# Patient Record
Sex: FEMALE | Race: WHITE | URBAN - METROPOLITAN AREA
[De-identification: names, ages, dates, MRNs, and addresses within clinical notes are randomized per-mention and may not be internally consistent; named-entity substitution may affect disease eponyms.]

---

## 2015-11-27 LAB
CHOLEST SERPL-MCNC: 217 MG/DL
HDLC SERPL-MCNC: 39 MG/DL
LDLC SERPL CALC-MCNC: 149 MG/DL
NONHDLC SERPL-MCNC: NORMAL MG/DL
TRIGL SERPL-MCNC: 144 MG/DL

## 2017-02-24 ENCOUNTER — OFFICE VISIT (OUTPATIENT)
Dept: FAMILY MEDICINE | Facility: CLINIC | Age: 52
End: 2017-02-24
Payer: COMMERCIAL

## 2017-02-24 VITALS
HEART RATE: 76 BPM | DIASTOLIC BLOOD PRESSURE: 83 MMHG | WEIGHT: 179.8 LBS | SYSTOLIC BLOOD PRESSURE: 138 MMHG | BODY MASS INDEX: 30.15 KG/M2 | OXYGEN SATURATION: 97 %

## 2017-02-24 DIAGNOSIS — L08.9 LOCAL INFECTION OF SKIN AND SUBCUTANEOUS TISSUE: ICD-10-CM

## 2017-02-24 DIAGNOSIS — L30.9 DERMATITIS: ICD-10-CM

## 2017-02-24 DIAGNOSIS — L02.12 BOIL OF NECK: Primary | ICD-10-CM

## 2017-02-24 PROCEDURE — 99213 OFFICE O/P EST LOW 20 MIN: CPT | Performed by: NURSE PRACTITIONER

## 2017-02-24 RX ORDER — CEPHALEXIN 500 MG/1
500 CAPSULE ORAL 3 TIMES DAILY
Qty: 30 CAPSULE | Refills: 0 | Status: SHIPPED | OUTPATIENT
Start: 2017-02-24 | End: 2017-03-07

## 2017-02-24 RX ORDER — CELECOXIB 200 MG/1
CAPSULE ORAL
Refills: 12 | COMMUNITY
Start: 2017-01-17 | End: 2017-10-05

## 2017-02-24 RX ORDER — BETAMETHASONE DIPROPIONATE 0.5 MG/G
OINTMENT, AUGMENTED TOPICAL
Qty: 45 G | Refills: 1 | Status: SHIPPED | OUTPATIENT
Start: 2017-02-24 | End: 2017-10-05

## 2017-02-24 RX ORDER — BETAMETHASONE DIPROPIONATE 0.5 MG/G
OINTMENT, AUGMENTED TOPICAL
Qty: 45 G | Refills: 0 | Status: SHIPPED | OUTPATIENT
Start: 2017-02-24 | End: 2017-02-24

## 2017-02-24 RX ORDER — SOLIFENACIN SUCCINATE 10 MG/1
TABLET, FILM COATED ORAL
COMMUNITY
Start: 2017-02-23

## 2017-02-24 NOTE — NURSING NOTE
"Chief Complaint   Patient presents with     Derm Problem       Initial /83  Pulse 76  Wt 179 lb 12.8 oz (81.6 kg)  SpO2 97%  BMI 30.15 kg/m2 Estimated body mass index is 30.15 kg/(m^2) as calculated from the following:    Height as of 11/19/12: 5' 4.75\" (1.645 m).    Weight as of this encounter: 179 lb 12.8 oz (81.6 kg).  Medication Reconciliation: complete     Peter Zimmerman CMA    "

## 2017-02-24 NOTE — PATIENT INSTRUCTIONS
Take full course of antibiotics, warm pack it 2-3 times per day, do not pick.  If symptoms persist, let me know and I will order a consult as we discussed.   What is a boil? -- A boil is an infection under the skin that causes a painful, pus-filled lump. A boil happens when bacteria infect a hair follicle. A hair follicle is a sac under the skin where a hair starts to grow (figure 1).   Boils usually happen on the back of the neck, face, armpits, and buttocks. But they can happen wherever hair grows on the body.   The medical term for a boil is  furuncle.  Sometimes, a few boils in the same area join together to form a big collection of pus. The medical term for a collection of boils is  carbuncle.   What are the symptoms of a boil? -- A boil is a painful, red lump under the skin. It is usually white or yellow in the center.   Boils can start out small and grow big quickly. They usually get more painful as they get bigger.  Should I try to treat my boil on my own? -- Yes. In order for your boil to heal, it needs to open so that the pus inside can drain out. Sometimes, boils open and drain on their own. But most boils need help to open and drain. After your boil drains, the pain should get much better.  To help your boil open and drain, you should put warm pressure on it. You can wet a clean wash cloth with warm water and put it on your boil. When the wash cloth cools, reheat it with warm water and put it back on the boil. Repeat these steps for 10 to 15 minutes every few hours. Make sure to wash your hands after you touch your boil.  Once your boil opens and drains, it should heal on its own. But it can take a few weeks for a boil to open, drain, and heal completely.  You should NOT squeeze or pop your boil. Doing these things can spread the infection.  Should I see a doctor or nurse? -- See a doctor or nurse if your boil doesn t get better after you try treating it at home. You should also see the doctor or nurse  if:  ?Your boil is very painful or gets worse.  ?Your boil gets better, but then comes back.  ?Your boil is on your face or spine.  ?You get a fever.  Will I need tests? -- Probably not. To make sure that you have a boil and not another skin condition, your doctor or nurse will ask about your symptoms and do an exam.  He or she might also take a small sample of pus from inside the boil. Then he or she will do lab tests on the sample to find out what kind of bacteria is causing the boil.   What other treatment might I have? -- If your boil is very big, your doctor might do a procedure to cut it open so it can drain. If you have a collection of boils, he or she will cut open the collection of boils to drain.   Depending on your boil and other factors, you might need to take antibiotic medicines.

## 2017-02-24 NOTE — MR AVS SNAPSHOT
After Visit Summary   2/24/2017    Mariely Apple    MRN: [id number]           Patient Information     Date Of Birth          1965        Visit Information        Provider Department      2/24/2017 4:00 PM Filomena Elias NP [facility]        Today's Diagnoses     Boil of neck    -  1    Local infection of skin and subcutaneous tissue        Dermatitis          Care Instructions    Take full course of antibiotics, warm pack it 2-3 times per day, do not pick.  If symptoms persist, let me know and I will order a consult as we discussed.   What is a boil? -- A boil is an infection under the skin that causes a painful, pus-filled lump. A boil happens when bacteria infect a hair follicle. A hair follicle is a sac under the skin where a hair starts to grow (figure 1).   Boils usually happen on the back of the neck, face, armpits, and buttocks. But they can happen wherever hair grows on the body.   The medical term for a boil is  furuncle.  Sometimes, a few boils in the same area join together to form a big collection of pus. The medical term for a collection of boils is  carbuncle.   What are the symptoms of a boil? -- A boil is a painful, red lump under the skin. It is usually white or yellow in the center.   Boils can start out small and grow big quickly. They usually get more painful as they get bigger.  Should I try to treat my boil on my own? -- Yes. In order for your boil to heal, it needs to open so that the pus inside can drain out. Sometimes, boils open and drain on their own. But most boils need help to open and drain. After your boil drains, the pain should get much better.  To help your boil open and drain, you should put warm pressure on it. You can wet a clean wash cloth with warm water and put it on your boil. When the wash cloth cools, reheat it with warm water and put it back on the boil. Repeat these steps for 10 to 15 minutes every few hours. Make sure to wash your  hands after you touch your boil.  Once your boil opens and drains, it should heal on its own. But it can take a few weeks for a boil to open, drain, and heal completely.  You should NOT squeeze or pop your boil. Doing these things can spread the infection.  Should I see a doctor or nurse? -- See a doctor or nurse if your boil doesn t get better after you try treating it at home. You should also see the doctor or nurse if:  ?Your boil is very painful or gets worse.  ?Your boil gets better, but then comes back.  ?Your boil is on your face or spine.  ?You get a fever.  Will I need tests? -- Probably not. To make sure that you have a boil and not another skin condition, your doctor or nurse will ask about your symptoms and do an exam.  He or she might also take a small sample of pus from inside the boil. Then he or she will do lab tests on the sample to find out what kind of bacteria is causing the boil.   What other treatment might I have? -- If your boil is very big, your doctor might do a procedure to cut it open so it can drain. If you have a collection of boils, he or she will cut open the collection of boils to drain.   Depending on your boil and other factors, you might need to take antibiotic medicines.          Follow-ups after your visit        Follow-up notes from your care team     Return if symptoms worsen or fail to improve.      Your next 10 appointments already scheduled     Feb 24, 2017  4:00 PM CST   Office Visit with Filomena Elias NP   [facility]    [address] [id number] [phone]           Bring a current list of meds and any records pertaining to this visit.  For Physicals, please bring immunization records and any forms needing to be filled out.  Please arrive 10 minutes early to complete paperwork.              Who to contact     Normal or non-critical lab and imaging results will be communicated to you by MyChart, letter or  phone within 4 business days after the clinic has received the results. If you do not hear from us within 7 days, please contact the clinic through [employer] or phone. If you have a critical or abnormal lab result, we will notify you by phone as soon as possible.  Submit refill requests through [employer] or call your pharmacy and they will forward the refill request to us. Please allow 3 business days for your refill to be completed.          If you need to speak with a  for additional information , please call: [phone]             Additional Information About Your Visit        [employer] Information     [employer] gives you secure access to your electronic health record. If you see a primary care provider, you can also send messages to your care team and make appointments. If you have questions, please call your primary care clinic.  If you do not have a primary care provider, please call [phone] and they will assist you.        Care EveryWhere ID     This is your Care EveryWhere ID. This could be used by other organizations to access your [place] medical records  [id number]        Your Vitals Were     Pulse Pulse Oximetry BMI (Body Mass Index)             76 97% 30.15 kg/m2          Blood Pressure from Last 3 Encounters:   02/24/17 138/83   12/04/12 127/88   11/19/12 124/76    Weight from Last 3 Encounters:   02/24/17 179 lb 12.8 oz (81.6 kg)   11/19/12 162 lb (73.5 kg)   10/31/11 170 lb (77.1 kg)              Today, you had the following     No orders found for display         Today's Medication Changes          These changes are accurate as of: 2/24/17  3:50 PM.  If you have any questions, ask your nurse or doctor.               Start taking these medicines.        Dose/Directions    cephALEXin 500 MG capsule   Commonly known as:  KEFLEX   Used for:  Boil of neck, Local infection of skin and subcutaneous tissue   Started by:  Filomena Elias NP        Dose:  500 mg   Take 1 capsule (500 mg)  by mouth 3 times daily   Quantity:  30 capsule   Refills:  0         These medicines have changed or have updated prescriptions.        Dose/Directions    * augmented betamethasone dipropionate 0.05 % ointment   Commonly known as:  DIPROLENE-AF   This may have changed:  Another medication with the same name was added. Make sure you understand how and when to take each.   Used for:  Eczema   Changed by:  Peter Lewis MD        Apply  topically 2 times daily as needed. Generic equivalent is preferred.   Quantity:  30 g   Refills:  2       * augmented betamethasone dipropionate 0.05 % ointment   Commonly known as:  DIPROLENE-AF   This may have changed:  You were already taking a medication with the same name, and this prescription was added. Make sure you understand how and when to take each.   Used for:  Dermatitis   Changed by:  Filomena Elias NP        Apply sparingly to affected area twice daily as needed.  Do not apply to face.   Quantity:  45 g   Refills:  0       * Notice:  This list has 2 medication(s) that are the same as other medications prescribed for you. Read the directions carefully, and ask your doctor or other care provider to review them with you.         Where to get your medicines      Some of these will need a paper prescription and others can be bought over the counter.  Ask your nurse if you have questions.     Bring a paper prescription for each of these medications     augmented betamethasone dipropionate 0.05 % ointment    cephALEXin 500 MG capsule                Primary Care Provider Office Phone #    Katya Spencer [facility] [phone]       No address on file        Thank you!     Thank you for choosing [facility]  for your care. Our goal is always to provide you with excellent care. Hearing back from our patients is one way we can continue to improve our services. Please take a few minutes to complete the written survey that you may receive in the mail after your  visit with us. Thank you!             Your Updated Medication List - Protect others around you: Learn how to safely use, store and throw away your medicines at www.disposemymeds.org.          This list is accurate as of: 2/24/17  3:50 PM.  Always use your most recent med list.                   Brand Name Dispense Instructions for use    ADVIL 200 MG capsule   Generic drug:  ibuprofen      1 CAPSULE EVERY 4 TO 6 HOURS AS NEEDED       * augmented betamethasone dipropionate 0.05 % ointment    DIPROLENE-AF    30 g    Apply  topically 2 times daily as needed. Generic equivalent is preferred.       * augmented betamethasone dipropionate 0.05 % ointment    DIPROLENE-AF    45 g    Apply sparingly to affected area twice daily as needed.  Do not apply to face.       celecoxib 200 MG capsule    celeBREX     TAKE 1 CAPSULE BY ORAL ROUTE TWICE DAILY AS NEEDED       cephALEXin 500 MG capsule    KEFLEX    30 capsule    Take 1 capsule (500 mg) by mouth 3 times daily       DULoxetine 30 MG EC capsule    CYMBALTA    60 capsule    Take one 30 mg capsule daily for 1 week ; then take 2 capsules daily       Fish Oil Oil      1 capsule.       meloxicam 7.5 MG tablet    MOBIC    60 tablet    Take 1-2 tablets by mouth daily.       MULTI VITAMIN/MINERALS PO      Take 1 tablet by mouth once.       predniSONE 5 MG tablet    DELTASONE    45 tablet    12.5 mg daily for one week then 7.5 mg daily for one week then stop       solifenacin 5 MG tablet    VESICARE    30 tablet    Take 1 tablet by mouth daily.       VESICARE 10 MG tablet   Generic drug:  solifenacin          * Notice:  This list has 2 medication(s) that are the same as other medications prescribed for you. Read the directions carefully, and ask your doctor or other care provider to review them with you.

## 2017-02-24 NOTE — PROGRESS NOTES
SUBJECTIVE:                                                    Mariely Apple is a 51 year old female who presents to clinic today for the following health issues:    Boil on neck- [id number]. Has grown, but now draining.   Pt using hot pack occasionally      Problem list and histories reviewed & adjusted, as indicated.  Additional history: as documented    Patient Active Problem List   Diagnosis     CARDIOVASCULAR SCREENING; LDL GOAL LESS THAN 160     Overactive bladder     OA (osteoarthritis)     Past Surgical History   Procedure Laterality Date     Appendectomy         Social History   Substance Use Topics     Smoking status: Never Smoker     Smokeless tobacco: Never Used     Alcohol use Yes      Comment: 1 drink per month     Family History   Problem Relation Age of Onset     DIABETES Father      HEART DISEASE Father      Obesity Father      Arthritis Maternal Grandmother          Current Outpatient Prescriptions   Medication Sig Dispense Refill     cephALEXin (KEFLEX) 500 MG capsule Take 1 capsule (500 mg) by mouth 3 times daily 30 capsule 0     augmented betamethasone dipropionate (DIPROLENE-AF) 0.05 % ointment Apply sparingly to affected area twice daily as needed.  Do not apply to face. 45 g 0     celecoxib (CELEBREX) 200 MG capsule TAKE 1 CAPSULE BY ORAL ROUTE TWICE DAILY AS NEEDED  12     VESICARE 10 MG tablet        meloxicam (MOBIC) 7.5 MG tablet Take 1-2 tablets by mouth daily. 60 tablet 4     DULoxetine (CYMBALTA) 30 MG capsule Take one 30 mg capsule daily for 1 week ; then take 2 capsules daily 60 capsule 1     predniSONE (DELTASONE) 5 MG tablet 12.5 mg daily for one week then 7.5 mg daily for one week then stop   45 tablet 0     solifenacin (VESICARE) 5 MG tablet Take 1 tablet by mouth daily. 30 tablet 12     augmented betamethasone dipropionate (DIPROLENE-AF) 0.05 % ointment Apply  topically 2 times daily as needed. Generic equivalent is preferred. 30 g 2     Multiple Vitamins-Minerals (MULTI  VITAMIN/MINERALS PO) Take 1 tablet by mouth once.       Fish Oil OIL 1 capsule.       ADVIL 200 MG OR CAPS 1 CAPSULE EVERY 4 TO 6 HOURS AS NEEDED       Allergies   Allergen Reactions     Cymbalta GI Disturbance     Zithromax [Azithromycin Dihydrate] Nausea     Latex Rash     BP Readings from Last 3 Encounters:   02/24/17 138/83   12/04/12 127/88   11/19/12 124/76    Wt Readings from Last 3 Encounters:   02/24/17 179 lb 12.8 oz (81.6 kg)   11/19/12 162 lb (73.5 kg)   10/31/11 170 lb (77.1 kg)            Labs reviewed in EPIC  Problem list, Medication list, Allergies, and Medical/Social/Surgical histories reviewed in [facility] and updated as appropriate.    ROS:  Constitutional, HEENT, cardiovascular, pulmonary, GI, , musculoskeletal, neuro, skin, endocrine and psych systems are negative, except as otherwise noted.    OBJECTIVE:                                                    /83  Pulse 76  Wt 179 lb 12.8 oz (81.6 kg)  SpO2 97%  BMI 30.15 kg/m2  Body mass index is 30.15 kg/(m^2).  GENERAL: healthy, alert and no distress  NECK: no adenopathy, no asymmetry, or scars and thyroid normal to palpation POSITIVE for mary lump on L side of neck, appears to be a boil, but hard, non-mobile. Apparent that pt has been picking on it. Pt states it improved with Augmentin for her sinus infection.  Will trial a different antibiotic at this time. Possible need for further assessment by ultrasound/ ENT/ general surgery if persists.  RESP: lungs clear to auscultation - no rales, rhonchi or wheezes  CV: regular rate and rhythm, normal S1 S2, no S3 or S4, no murmur, click or rub, no peripheral edema and peripheral pulses strong  SKIN: POSITIVE for dermatitis on hands- between her fingers- ongoing for which she uses augmented betamethasone dipropionate  NEURO: A&O, mentation intact and speech normal  LYMPH: no cervical, supraclavicular adenopathy    Diagnostic Test Results:  none      ASSESSMENT/PLAN:                                                           ICD-10-CM    1. Boil of neck L02.12 cephALEXin (KEFLEX) 500 MG capsule   2. Local infection of skin and subcutaneous tissue L08.9 cephALEXin (KEFLEX) 500 MG capsule   3. Dermatitis L30.9 augmented betamethasone dipropionate (DIPROLENE-AF) 0.05 % ointment    hands- has used this successfully in the past.        See Patient Instructions: Take full course of antibiotics, warm pack it 2-3 times per day, do not pick.  If symptoms persist, let me know and I will order a consult as we discussed.     Filomena Elias, P  [facility]

## 2017-02-27 ENCOUNTER — MYC MEDICAL ADVICE (OUTPATIENT)
Dept: FAMILY MEDICINE | Facility: CLINIC | Age: 52
End: 2017-02-27

## 2017-03-06 ENCOUNTER — MYC MEDICAL ADVICE (OUTPATIENT)
Dept: FAMILY MEDICINE | Facility: OTHER | Age: 52
End: 2017-03-06

## 2017-03-07 ENCOUNTER — TELEPHONE (OUTPATIENT)
Dept: FAMILY MEDICINE | Facility: CLINIC | Age: 52
End: 2017-03-07

## 2017-03-07 DIAGNOSIS — L02.12 BOIL OF NECK: ICD-10-CM

## 2017-03-07 DIAGNOSIS — L08.9 LOCAL INFECTION OF SKIN AND SUBCUTANEOUS TISSUE: ICD-10-CM

## 2017-03-07 RX ORDER — CEPHALEXIN 500 MG/1
500 CAPSULE ORAL 3 TIMES DAILY
Qty: 30 CAPSULE | Refills: 0 | Status: SHIPPED | OUTPATIENT
Start: 2017-03-07 | End: 2017-10-05

## 2017-03-07 NOTE — TELEPHONE ENCOUNTER
Call to home number , pt is at work and person states she can not be reached there , requested pt to call clinic when she gets message

## 2017-03-07 NOTE — TELEPHONE ENCOUNTER
Spoke with patient and gave below information and instructions, she voiced understanding.  Dermatology referral numbers sent to patient through MY chart as requested by patient.  She will let us know if any questions or concerns.  Arely Maurice RN

## 2017-03-07 NOTE — TELEPHONE ENCOUNTER
Additional antibiotics sent in for pt. She needs to take daily probiotic with this to prevent diarrhea associated with antibiotic use. As discussed with pt, I do not feel comfortable lancing this, as it was solid and in an area that I do not bi. She needs to follow up with dermatology or another provider if it does not resolve with antibiotics. Order for dermatology placed. DANA Felder, FNP-BC

## 2017-03-07 NOTE — TELEPHONE ENCOUNTER
Mariely called the nurse triage line 03/07/2017 @ 0504. She states she completed the 10 day course of antibiotics prescribed for the boil on her neck. The boil has improved but is not gone. Mariely says the antibiotics helped a lot and she is wondering if she can either have more prescribed, or be seen in the clinic again to have the boil lanced. Mariely was very frustrated on the phone. She states she has called and left messages since Friday, 03/03/2017, but has not heard back yet. Today she will be at work and would like her cell phone called at [phone]. She also states that if she cannot answer, to please leave a very detailed message as to what would be the next steps with her boil. (Antibiotics vs. Jason). If a script is sent, please send to [facility] in [place]. Thank you.     Domenica Irving RN  FNA    Routed to: Filomena Elias NP and JOSE cannon

## 2017-03-22 ENCOUNTER — TELEPHONE (OUTPATIENT)
Dept: FAMILY MEDICINE | Facility: CLINIC | Age: 52
End: 2017-03-22

## 2017-03-22 NOTE — TELEPHONE ENCOUNTER
Patient is calling to ask provider to fax the referral to insurance company [employer] didn't have any fax number  Please call when this has been done  Thank you

## 2017-03-22 NOTE — TELEPHONE ENCOUNTER
Pt calling back, [facility] customer service number is [phone].  Dermatology appt scheduled and needs to have this referral in place.  Okay to leave message.  [facility] will not pay unless referral is sent.

## 2017-03-24 ENCOUNTER — MYC MEDICAL ADVICE (OUTPATIENT)
Dept: FAMILY MEDICINE | Facility: CLINIC | Age: 52
End: 2017-03-24

## 2017-03-24 ENCOUNTER — TELEPHONE (OUTPATIENT)
Dept: FAMILY MEDICINE | Facility: CLINIC | Age: 52
End: 2017-03-24

## 2017-03-24 NOTE — TELEPHONE ENCOUNTER
Panel Management Review      Patient has the following on her problem list:       Composite cancer screening  Chart review shows that this patient is due/due soon for the following Pap Smear, Mammogram and Colonoscopy  Summary:    Patient is due/failing the following:   COLONOSCOPY  MAMMOGRAM  PAP and PHYSICAL      Type of outreach:    Sent [employer] message.    Questions for provider review:    None                                                                                                                                    Leonie Thomason MA       Chart routed to Care Team .

## 2017-03-28 ENCOUNTER — MYC MEDICAL ADVICE (OUTPATIENT)
Dept: FAMILY MEDICINE | Facility: OTHER | Age: 52
End: 2017-03-28

## 2017-03-31 NOTE — TELEPHONE ENCOUNTER
Patient responded to mychart. She has completed pap, mammo, and lipid elsewhere. Records received and sent to scanning/abstracting. Will postpone to make sure they are in chart next week. Leonie Thomason MA

## 2017-04-10 ENCOUNTER — MYC MEDICAL ADVICE (OUTPATIENT)
Dept: FAMILY MEDICINE | Facility: CLINIC | Age: 52
End: 2017-04-10

## 2017-04-11 ENCOUNTER — MYC MEDICAL ADVICE (OUTPATIENT)
Dept: FAMILY MEDICINE | Facility: CLINIC | Age: 52
End: 2017-04-11

## 2017-05-14 ENCOUNTER — MYC MEDICAL ADVICE (OUTPATIENT)
Dept: FAMILY MEDICINE | Facility: CLINIC | Age: 52
End: 2017-05-14

## 2017-06-06 ENCOUNTER — MYC MEDICAL ADVICE (OUTPATIENT)
Dept: FAMILY MEDICINE | Facility: CLINIC | Age: 52
End: 2017-06-06

## 2017-06-28 ENCOUNTER — MYC MEDICAL ADVICE (OUTPATIENT)
Dept: FAMILY MEDICINE | Facility: CLINIC | Age: 52
End: 2017-06-28

## 2017-09-01 ENCOUNTER — MYC MEDICAL ADVICE (OUTPATIENT)
Dept: FAMILY MEDICINE | Facility: CLINIC | Age: 52
End: 2017-09-01

## 2017-09-29 ENCOUNTER — MYC MEDICAL ADVICE (OUTPATIENT)
Dept: FAMILY MEDICINE | Facility: CLINIC | Age: 52
End: 2017-09-29

## 2017-10-02 ENCOUNTER — MYC MEDICAL ADVICE (OUTPATIENT)
Dept: FAMILY MEDICINE | Facility: CLINIC | Age: 52
End: 2017-10-02

## 2017-10-03 ENCOUNTER — MYC MEDICAL ADVICE (OUTPATIENT)
Dept: FAMILY MEDICINE | Facility: CLINIC | Age: 52
End: 2017-10-03

## 2017-10-03 DIAGNOSIS — Z12.11 SCREENING FOR COLON CANCER: Primary | ICD-10-CM

## 2017-10-05 ENCOUNTER — OFFICE VISIT (OUTPATIENT)
Dept: FAMILY MEDICINE | Facility: CLINIC | Age: 52
End: 2017-10-05
Payer: COMMERCIAL

## 2017-10-05 VITALS
DIASTOLIC BLOOD PRESSURE: 84 MMHG | SYSTOLIC BLOOD PRESSURE: 110 MMHG | HEART RATE: 90 BPM | BODY MASS INDEX: 28 KG/M2 | WEIGHT: 174.2 LBS | TEMPERATURE: 98.9 F | HEIGHT: 66 IN | OXYGEN SATURATION: 97 %

## 2017-10-05 DIAGNOSIS — M19.90 ARTHRITIS: Primary | ICD-10-CM

## 2017-10-05 PROCEDURE — 99213 OFFICE O/P EST LOW 20 MIN: CPT | Performed by: NURSE PRACTITIONER

## 2017-10-05 RX ORDER — CELECOXIB 200 MG/1
CAPSULE ORAL
Qty: 60 CAPSULE | Refills: 3 | Status: SHIPPED | OUTPATIENT
Start: 2017-10-05

## 2017-10-05 NOTE — PATIENT INSTRUCTIONS
Celebrex max dose- 200 mg twice daily. Follow up if you have any other health care questions or concerns that you would like to address.       What is Arthritis?  Arthritis is a disease that affects the joints (the parts where bones meet and move). It can affect any joint in your body. There are many types of arthritis, including osteoarthritis and rheumatoid arthrtitis. If your symptoms are mild, medications may be enough to reduce pain and swelling. For more severe arthritis, surgery may be needed to improve the condition of the joint or replace the joint entirely.                  What causes arthritis?  Cartilage is a smooth substance that protects the ends of your bones and provides cushioning. When you have arthritis, this cartilage breaks down and can no longer protect your bones. The bones rub against each other, causing pain and swelling. Over time, bone spurs (small pieces of rough or splintered bone) may develop, and the joint's range of motion can become limited.  Symptoms  Some of the more common symptoms of arthritis include:    Joint pain and stiffness. Pain and stiffness get worse with long periods of rest or using a joint too long or too hard.    Joints that have lost normal shape and motion.    Tender, inflamed joints. They may look red and feel warm.    Grinding or popping noise with joint movement.     Feeling tired all the time.  Reducing symptoms  Following a healthy lifestyle by losing weight and exercising can help reduce symptoms of osteoarthritis. Medicines can be very helpful for arthritis.     Date Last Reviewed: 9/10/2015    3463-1490 The [employer]. [address] [place] [address], [place] [address]. All rights reserved. This information is not intended as a substitute for professional medical care. Always follow your healthcare professional's instructions.        What is bursitis?  A bursa is a fluid-filled sac that helps cushion the muscles, tendons, and bones around a joint. When a  bursa becomes inflamed, it s called bursitis. Common symptoms of bursitis include pain, tenderness, and swelling that limits movement of the joint.  What causes bursitis?  Bursitis is most often caused by overuse of a joint. The repeated movements irritate the bursa and may cause it to swell. When that happens, other surrounding tissues may become inflamed or have less space to move. Bursitis is most common in large joints such as the knee, shoulder, and hip.       Nonsurgical treatment involves both rest and exercise.    How is bursitis treated?  To help reduce pain and swelling, your healthcare provider may recommend one or more of the following:     Rest gives the bursa time to heal. This means limiting activities that put stress on the joint.    Anti-inflammatory medications help reduce painful swelling. In some cases, this can include injections of cortisone or other steroid medicines into the bursa.    Splints and support bandages improve your comfort and allow the bursa to heal.    Physical therapy may be used to increase flexibility and strengthen muscles that support the joint.    Aspiration removes extra fluid from the bursa using a needle. This can help your healthcare provider find out what is causing your bursitis. For example, it might be an infection or overuse.     Surgery can be used to remove an inflamed or infected bursa. This is rarely needed.  Date Last Reviewed: 9/11/2015 2000-2017 [employer]. [address] [place] [address], [place] [address]. All rights reserved. This information is not intended as a substitute for professional medical care. Always follow your healthcare professional's instructions.

## 2017-10-05 NOTE — MR AVS SNAPSHOT
After Visit Summary   10/5/2017    Mariely Apple    MRN: [id number]           Patient Information     Date Of Birth          1965        Visit Information        Provider Department      10/5/2017 4:00 PM Filomena Elias, NP [facility]        Care Instructions    Celebrex max dose- 200 mg twice daily. Follow up if you have any other health care questions or concerns that you would like to address.           Follow-ups after your visit        Follow-up notes from your care team     Return if symptoms worsen or fail to improve.      Your next 10 appointments already scheduled     Dec 04, 2017  4:00 PM CST   PHYSICAL with Filomena Elias NP   [facility] ([facility])    [address] [facility] [address] [phone]              Who to contact     Normal or non-critical lab and imaging results will be communicated to you by YouHelphart, letter or phone within 4 business days after the clinic has received the results. If you do not hear from us within 7 days, please contact the clinic through MyChart or phone. If you have a critical or abnormal lab result, we will notify you by phone as soon as possible.  Submit refill requests through SoLatina or call your pharmacy and they will forward the refill request to us. Please allow 3 business days for your refill to be completed.          If you need to speak with a  for additional information , please call: [phone]             Additional Information About Your Visit        You[employer]ph[employer] Information     SoLatina gives you secure access to your electronic health record. If you see a primary care provider, you can also send messages to your care team and make appointments. If you have questions, please call your primary care clinic.  If you do not have a primary care provider, please call [phone] and they will assist you.        Care EveryWhere ID     This is your Care EveryWhere ID. This  "could be used by other organizations to access your [facility] medical records  [id number]        Your Vitals Were     Pulse Temperature Height Pulse Oximetry BMI (Body Mass Index)       90 98.9  F (37.2  C) (Oral) 5' 5.75\" (1.67 m) 97% 28.33 kg/m2        Blood Pressure from Last 3 Encounters:   10/05/17 110/84   02/24/17 138/83   12/04/12 127/88    Weight from Last 3 Encounters:   10/05/17 174 lb 3.2 oz (79 kg)   02/24/17 179 lb 12.8 oz (81.6 kg)   11/19/12 162 lb (73.5 kg)              Today, you had the following     No orders found for display       Primary Care Provider Office Phone # Fax #    Filomena EliasELE [phone] [address] [facility] [address]        Equal Access to Services     [facility]: Hadii aad ku hadasho Soomaali, waaxda luqadaha, qaybta kaalmada adeegyada, waxay idiin hayaan adeeg kharajanette lachinon . So [facility] [phone].    ATENCIÓN: Si habla español, tiene a salgado disposición servicios gratuitos de asistencia lingüística. Darrell al [phone].    We comply with applicable federal civil rights laws and [place] laws. We do not discriminate on the basis of race, color, national origin, age, disability, sex, sexual orientation, or gender identity.            Thank you!     Thank you for choosing [facility]  for your care. Our goal is always to provide you with excellent care. Hearing back from our patients is one way we can continue to improve our services. Please take a few minutes to complete the written survey that you may receive in the mail after your visit with us. Thank you!             Your Updated Medication List - Protect others around you: Learn how to safely use, store and throw away your medicines at www.disposemymeds.org.          This list is accurate as of: 10/5/17  4:24 PM.  Always use your most recent med list.                   Brand Name Dispense Instructions for use Diagnosis    celecoxib 200 MG capsule    celeBREX     TAKE 1 " CAPSULE BY ORAL ROUTE TWICE DAILY AS NEEDED        VESICARE 10 MG tablet   Generic drug:  solifenacin

## 2017-10-05 NOTE — PROGRESS NOTES
SUBJECTIVE:   Mariely Apple is a 52 year old female who presents to clinic today for the following health issues:      Patient is here to discuss limitations for work- due to her arthritis- back, knees and bursitis- shoulders, hips, she is unable to stand for long periods of time.     Problem list and histories reviewed & adjusted, as indicated.  Additional history: as documented    Patient Active Problem List   Diagnosis     CARDIOVASCULAR SCREENING; LDL GOAL LESS THAN 160     Overactive bladder     OA (osteoarthritis)     Past Surgical History:   Procedure Laterality Date     APPENDECTOMY         Social History   Substance Use Topics     Smoking status: Never Smoker     Smokeless tobacco: Never Used     Alcohol use Yes      Comment: 1 drink per month     Family History   Problem Relation Age of Onset     DIABETES Father      HEART DISEASE Father      Obesity Father      Arthritis Maternal Grandmother          Current Outpatient Prescriptions   Medication Sig Dispense Refill     celecoxib (CELEBREX) 200 MG capsule TAKE 1 CAPSULE BY ORAL ROUTE TWICE DAILY AS NEEDED 60 capsule 3     VESICARE 10 MG tablet        Allergies   Allergen Reactions     Cymbalta GI Disturbance     Zithromax [Azithromycin Dihydrate] Nausea     Latex Rash     BP Readings from Last 3 Encounters:   10/05/17 110/84   02/24/17 138/83   12/04/12 127/88    Wt Readings from Last 3 Encounters:   10/05/17 174 lb 3.2 oz (79 kg)   02/24/17 179 lb 12.8 oz (81.6 kg)   11/19/12 162 lb (73.5 kg)                  Labs reviewed in EPIC        Reviewed and updated as needed this visit by clinical staff  Tobacco  Allergies  Meds  Med Hx  Surg Hx  Fam Hx  Soc Hx      Reviewed and updated as needed this visit by Provider         ROS:  Constitutional, HEENT, cardiovascular, pulmonary, GI, , musculoskeletal, neuro, skin, endocrine and psych systems are negative, except as otherwise noted.      OBJECTIVE:   /84  Pulse 90  Temp 98.9  F (37.2  C)  "(Oral)  Ht 5' 5.75\" (1.67 m)  Wt 174 lb 3.2 oz (79 kg)  SpO2 97%  BMI 28.33 kg/m2  Body mass index is 28.33 kg/(m^2).  GENERAL: healthy, alert and no distress  RESP: lungs clear to auscultation - no rales, rhonchi or wheezes  CV: regular rate and rhythm, normal S1 S2, no S3 or S4, no murmur, click or rub, no peripheral edema and peripheral pulses strong  MS: no gross musculoskeletal defects noted, no edema  SKIN: no suspicious lesions or rashes  NEURO: Normal strength and tone, mentation intact and speech normal  PSYCH: mentation appears normal, affect normal/bright    Diagnostic Test Results:  none     ASSESSMENT/PLAN:         ICD-10-CM    1. Arthritis M19.90 celecoxib (CELEBREX) 200 MG capsule     RHEUMATOLOGY REFERRAL       See Patient Instructions    ESTRELLITA Pang  [facility]  "

## 2017-10-05 NOTE — NURSING NOTE
"Chief Complaint   Patient presents with     Letter for School/Work       Initial /82  Pulse 90  Temp 98.9  F (37.2  C) (Oral)  Ht 5' 5.75\" (1.67 m)  Wt 174 lb 3.2 oz (79 kg)  SpO2 97%  BMI 28.33 kg/m2 Estimated body mass index is 28.33 kg/(m^2) as calculated from the following:    Height as of this encounter: 5' 5.75\" (1.67 m).    Weight as of this encounter: 174 lb 3.2 oz (79 kg).  Medication Reconciliation: complete     Leonie Thomason MA  "

## 2017-10-07 ENCOUNTER — MYC MEDICAL ADVICE (OUTPATIENT)
Dept: FAMILY MEDICINE | Facility: CLINIC | Age: 52
End: 2017-10-07

## 2017-10-09 ENCOUNTER — TELEPHONE (OUTPATIENT)
Dept: FAMILY MEDICINE | Facility: CLINIC | Age: 52
End: 2017-10-09

## 2017-10-09 NOTE — TELEPHONE ENCOUNTER
HM is not recognizing flu vaccines given before 09/01/17. HM overrided. Patient informed that this should not show due in My chart. Rafaela Steve MA

## 2017-10-09 NOTE — TELEPHONE ENCOUNTER
Patient continues to receive reminder to obtain a flu shot on MyChart. Patient has a flu shot listed in EPIC done on 8/31/17. Please update patient's health maintenance for this to reflect the correct dates of service.     Central Scheduling  Geronimo MILES

## 2017-10-10 NOTE — TELEPHONE ENCOUNTER
Called pt and told her that referral was not required for a doctor within [facility]. She explained that in the past she sometimes needed them. Advised her to call the insurance company in that case. We will help when needed.

## 2017-10-13 ENCOUNTER — MYC MEDICAL ADVICE (OUTPATIENT)
Dept: FAMILY MEDICINE | Facility: CLINIC | Age: 52
End: 2017-10-13

## 2017-10-13 DIAGNOSIS — Z12.11 SPECIAL SCREENING FOR MALIGNANT NEOPLASMS, COLON: Primary | ICD-10-CM

## 2017-10-16 ENCOUNTER — TELEPHONE (OUTPATIENT)
Dept: FAMILY MEDICINE | Facility: CLINIC | Age: 52
End: 2017-10-16

## 2017-10-16 NOTE — TELEPHONE ENCOUNTER
Reason for Call:  Other call back    Detailed comments: patient would like a return call to continue the discussion regarding the FIT test and referral options, please call to discuss further.     Phone Number Patient can be reached at:   Cell number on file:    Telephone Information:   Mobile [phone]     Best Time: today    Can we leave a detailed message on this number? YES    Call taken on 10/16/2017 at 3:23 PM by Shelia Cui

## 2017-10-16 NOTE — TELEPHONE ENCOUNTER
RN placed call to patient. Left message for patient to return call to clinic. RN hotline number given.  Patient prefers Paper[facility] message because can not talk until after 4pm.    Alesia Cox RN

## 2017-10-16 NOTE — TELEPHONE ENCOUNTER
Reason for call:  Other   Patient called regarding (reason for call): referral  Additional comments: patient called back to say she does not want the referral.?      Phone number to reach patient:  Home number on file [phone] (home)    Best Time:  anytime    Can we leave a detailed message on this number?  YES

## 2017-10-16 NOTE — TELEPHONE ENCOUNTER
RN checked with lab to see if we had colonguard test. We do not have the colononguard test. We can do FIT testing. Left message on patients phone ok per patient with information.    Alesia Cox RN

## 2017-10-16 NOTE — TELEPHONE ENCOUNTER
Call her. FIT test has been ordered. This is not what I would recommend however, as it only tests for blood and not for cancer. She will need a colonoscopy if her FIT is positive. Therefore, I still strongly encourage a colonoscopy as it is the only test that can find precancerous polyps plus it can find early cancers and save lives.     LY CARLSON M.D.

## 2018-01-23 ENCOUNTER — TELEPHONE (OUTPATIENT)
Dept: FAMILY MEDICINE | Facility: CLINIC | Age: 53
End: 2018-01-23

## 2018-01-23 NOTE — TELEPHONE ENCOUNTER
Patient is returning call, please put on do not call list for patient is not longer a [facility] patient. Thank you.

## 2018-01-23 NOTE — TELEPHONE ENCOUNTER
1/23/2018    Call Regarding Preventive Health Screening Cervical/PAP    Attempt 1    Message on voicemail     Comments:           Outreach   AT

## 2021-04-18 NOTE — TELEPHONE ENCOUNTER
Patient prefers not to complete colonoscopy. Would like alternative testing completed. Ordered keon'd up.   Routing to  to advise.      Alesia Cox RN     12.75